# Patient Record
(demographics unavailable — no encounter records)

---

## 2025-07-01 NOTE — HISTORY OF PRESENT ILLNESS
[FreeTextEntry1] : Language: English Accompanied by: Self Contact info: [phone] Referring Provider/PCP: Dr. Samir Rivera    Initial H&P 2023: Ms. Stephenson is a very pleasant 67F who presents today for initial evaluation of possible UTI.   She c/o spasms/pain while voiding.  Also has symptoms when she isn't voiding.  Starts as lower abdominal/pelvic pain that radiates to her vaginal area as well as back and rectum. 6-7/10 at its worst, subsides after a few seconds.  Denies GH.   Has been experiencing these symptoms for week. Started after a long flight from [place]. Was holding it in for a prolonged period of time during the flight.  Associated with urinary urgency. C/o increased frequency.  Denies dysuria.  Denies flank pain.   Also feels like she needs to have a BM when she has the lower belly pain. Describes BMs as normal and at her baseline.  Has a history of nephrolithiasis s/p ESWL 5y ago ([facility]).    Denies Delia.   Was told by her gyn that she has a mild anterior prolapse.  --------------------------------------------------------------------------------------------------------------------------------------- Interval History 2025: Presents today for f/u. Last seen in the office 23.   At her last visit, she was concern for UTI.  Her urinalysis was negative at the time.  She also has a history of nephrolithiasis, and she was concerned that she had a stone.  Given these findings, I recommended CT scan although we did discuss that she did not have the typical stone symptoms.  She did opt for CT, however it was never done.  Today, she states that she went to her gyn recently for a routine checkup, and she was told that she had 3+ blood in the urine.  Unsure if she had formal microscopy.    Otherwise no symptoms or issues with urination.  --------------------------------------------------------------------------------------------------------------------------------------- PMHx: Nephrolithiasis, C spine stenosis, HTN PSHx: Carpal Tunnel, Trigger Finger, ESWL, , Foot surgery SHx: former tob 30y ago, retired , [facility] FHx: Father - bladder ca (cystectomy when he was 70,  at 72), Mother - thyroid ca, uncle - colon ca   All: NKDA  --------------------------------------------------------------------------------------------------------------------------------------- Physical Exam:  General: NAD, sitting on exam table comfortably HEENT: NCAT, EOMI Resp: breathing comfortably on RA, b/l symm chest rise Cardiac: RRR Abd: soft, mildly TTP lower abdominal, otherwise NTND Back: (-) CVAT b/l MSK: SCOOBY krishna/ FROM Psych: appropriate affect  --------------------------------------------------------------------------------------------------------------------------------------- Results:   --------------------------------------------------------------------------------------------------------------------------------------- A/P: 67F with possible hematuria.   1. AMH We had a long discussion today about the patient's current condition, and all of the potential causes. I explained that MH may be due to a large number of pathologies, including but not limited to cancer, prostatic enlargement (when the prostate is present), infection/inflammation of any part of the urinary tract, stones, urethral stricture disease, trauma (including iatrogenic), and various bleeding/medical disorders. In order to better understand the cause of bleeding, I explained that the standard workup consists of two parts: First, an imaging study is done, based on one's risk factors.  Based on this patient's history, she will require a CT Urogram.    The second part of the workup includes office cystoscopy. Unless contraindicated, cystoscopy is almost always recommended after imaging. We discussed that if there is a very obvious surgical issue on US, and we know OR intervention is anticipated, we can sometimes forego office cystoscopy, as it will already be performed in the OR. Nonetheless, sometimes it is recommended to undergo office cystoscopy prior to OR, as it helps with surgical planning. Risks of cystoscopy were explained in detail, including but not limited to bleeding, infection, urethral stricture formation, and post procedural retention. The patient was notified to call the office immediately, should they experience any concerning symptoms, or proceed straight to the ER.  The patient was instructed to make an appt for cystoscopy 1-2 weeks after imaging.   Given that we do not have formal microscopy of her urine, I recommended she undergo urinalysis first, and if she does have greater than 3 RBCs on the urinalysis, she will undergo full workup as discussed above.  If negative, she will follow-up as needed.  Plan: - repeat UA - poss AMH w/u as discussed above if UA pos - RTC TBD  I spent a total of 33 minutes on face-to-face counseling, coordination of care, review of prior records and clinical documentation.